# Patient Record
Sex: FEMALE | Race: BLACK OR AFRICAN AMERICAN | NOT HISPANIC OR LATINO
[De-identification: names, ages, dates, MRNs, and addresses within clinical notes are randomized per-mention and may not be internally consistent; named-entity substitution may affect disease eponyms.]

---

## 2019-11-21 ENCOUNTER — APPOINTMENT (OUTPATIENT)
Dept: ORTHOPEDIC SURGERY | Facility: CLINIC | Age: 64
End: 2019-11-21
Payer: COMMERCIAL

## 2019-11-21 VITALS — WEIGHT: 250 LBS | HEIGHT: 60 IN | BODY MASS INDEX: 49.08 KG/M2

## 2019-11-21 DIAGNOSIS — Z86.39 PERSONAL HISTORY OF OTHER ENDOCRINE, NUTRITIONAL AND METABOLIC DISEASE: ICD-10-CM

## 2019-11-21 DIAGNOSIS — Z78.9 OTHER SPECIFIED HEALTH STATUS: ICD-10-CM

## 2019-11-21 DIAGNOSIS — Z86.79 PERSONAL HISTORY OF OTHER DISEASES OF THE CIRCULATORY SYSTEM: ICD-10-CM

## 2019-11-21 PROBLEM — Z00.00 ENCOUNTER FOR PREVENTIVE HEALTH EXAMINATION: Status: ACTIVE | Noted: 2019-11-21

## 2019-11-21 PROCEDURE — 99202 OFFICE O/P NEW SF 15 MIN: CPT | Mod: 25

## 2019-11-21 PROCEDURE — 20526 THER INJECTION CARP TUNNEL: CPT | Mod: 50

## 2019-11-21 NOTE — PROCEDURE
[Bilateral] : bilaterally.   [Small Joint___] : [unfilled] joint [Inflammation] : Inflammation [Patient] : patient [Risk] : Risk [Bleeding] : bleeding [Verbal Consent Obtained] : verbal consent was obtained prior to the procedure [Alcohol] : Alcohol [Ethyl Chloride Spray] : ethyl chloride spray was used as a topical anesthetic [Superior] : superior [20] : a 20-gauge [0.25% Bupivacaine___(mL)] : [unfilled] mL 0.25% Bupivacaine [Dexameth. 4mg/mL___(mL)] : [unfilled] ~U mL of 4 mg/mL dexamethasone [Bandage Applied] : a bandage [Tolerated Well] : The patient tolerated the procedure well [None] : None

## 2019-11-25 PROBLEM — Z86.79 HISTORY OF HYPERTENSION: Status: RESOLVED | Noted: 2019-11-21 | Resolved: 2019-11-25

## 2019-11-25 PROBLEM — Z78.9 NEVER EXERCISES: Status: ACTIVE | Noted: 2019-11-21

## 2019-11-25 PROBLEM — Z86.39 HISTORY OF HIGH CHOLESTEROL: Status: RESOLVED | Noted: 2019-11-21 | Resolved: 2019-11-25

## 2019-11-25 PROBLEM — Z86.39 HISTORY OF DIABETES MELLITUS: Status: RESOLVED | Noted: 2019-11-21 | Resolved: 2019-11-25

## 2019-11-25 NOTE — PHYSICAL EXAM
[FreeTextEntry2] : Bilateral hands\par +Carpal tunnel compression\par +Phalens\par - Tinel's\par No weakness in abductor pollicis\par No thenar muscle wasting [de-identified] : Ultrasound performed outpatient engative for any tenosynovitis of bilateral hands\par XR read negative for fracture

## 2019-11-25 NOTE — HISTORY OF PRESENT ILLNESS
[de-identified] : 64F LHD with bilateral carpal tunnel syndrome for 10 years duration. Diagnosed at [facility] 10 years ago, confirmed with EMG study, has been wearing night splints for past 10 years however has noted worsening over bilateral hand pain L>R over past several months. Numbness/tingling in first 3.5 digits, no weakness. Is woken up at night with bilateral hand pain. Denies triggering symptoms.

## 2019-12-26 ENCOUNTER — APPOINTMENT (OUTPATIENT)
Dept: ORTHOPEDIC SURGERY | Facility: CLINIC | Age: 64
End: 2019-12-26

## 2019-12-26 NOTE — PHYSICAL EXAM
[de-identified] : General: Well-appearing middle-aged female; NAD; A&Ox3\par b/l hands: Grossly normal in appearance, no signs of trauma\par Normal muscle bulk of thenar eminence b/l\par Sensation intact over volar and dorsal aspects of all digits\par Muscle strength M/U/R nerves 5/5 and equal b/l\par Positive CT compression test on L, negative on R\par Negative Tinel's b/l\par Hand WWP, Cap refill < 2 sec [de-identified] : No imaging

## 2019-12-26 NOTE — HISTORY OF PRESENT ILLNESS
[de-identified] : 64F w longstanding b/l carpal tunnel syndrome s/p b/l steroid injections at last visit. Pt was sent for EMG but next available appt was not until 02/2020. Pt has improved significantly since injections last visit. Night time sxs, numbness/tingling sxs and weakness have resolved at this point.

## 2019-12-26 NOTE — ASSESSMENT
[FreeTextEntry1] : 64F w long standing carpal tunnel approx 5 weeks s/p b/l carpal tunnel injections w significant improvement in sxs\par -No repeat injection today\par -Advised patient to get next available appt for EMG study which is likely to be in March 2020\par -If sxs remain significantly improved, may call and cancel EMG study and no need to RTC\par -If sxs return, obtain EMG and may RTC in 4-6 weeks for repeat injection\par -Resume use of night splints PRN

## 2020-03-13 ENCOUNTER — APPOINTMENT (OUTPATIENT)
Dept: NEUROLOGY | Facility: CLINIC | Age: 65
End: 2020-03-13

## 2020-03-13 ENCOUNTER — APPOINTMENT (OUTPATIENT)
Dept: NEUROLOGY | Facility: CLINIC | Age: 65
End: 2020-03-13
Payer: COMMERCIAL

## 2020-03-13 VITALS
BODY MASS INDEX: 49.48 KG/M2 | SYSTOLIC BLOOD PRESSURE: 142 MMHG | DIASTOLIC BLOOD PRESSURE: 91 MMHG | OXYGEN SATURATION: 98 % | WEIGHT: 252 LBS | HEIGHT: 60 IN | TEMPERATURE: 98.2 F | HEART RATE: 69 BPM

## 2020-03-13 DIAGNOSIS — M25.539 PAIN IN UNSPECIFIED WRIST: ICD-10-CM

## 2020-03-13 PROCEDURE — 99203 OFFICE O/P NEW LOW 30 MIN: CPT

## 2020-03-13 PROCEDURE — 95911 NRV CNDJ TEST 9-10 STUDIES: CPT

## 2020-03-13 PROCEDURE — 95885 MUSC TST DONE W/NERV TST LIM: CPT | Mod: 59

## 2020-03-13 PROCEDURE — 76882 US LMTD JT/FCL EVL NVASC XTR: CPT | Mod: RT

## 2020-03-13 NOTE — HISTORY OF PRESENT ILLNESS
[FreeTextEntry1] : CC: pain in hands\par \par HPI: 64 year old woman referred by Dr. Fofana for pain in hands\par \par Location: wrists and hands, first three fingers; worse on the left than the right \par Quality: sharp pain\par Severity: moderate\par Duration: > 5 years \par Timing: worst at night\par Modifying Factors: worse with typing; initially better with steroid injections (11/2019) but symptoms started to return about a month ago and have been getting progressively worse\par Associated signs and symptoms: tingling, numbness\par \par Data reviewed:\par Imaging (reports): US of wrists no tenosynovitis of hands; x-ray - no fracture\par Prior records: Reviewed notes from Dr. Rai and Dr. Fofana\par \par ROS: 13 pt review of systems performed and reviewed with patient (General, Eyes, Ears, Cardiovascular, Respiratory, Gastrointestinal, Genitourinary, Musculoskeletal, Skin, Endocrine, Hematologic, Psychiatric, Neurologic)\par Past medical history, surgical history, social history, and family history reviewed with patient\par See scanned document for details

## 2020-03-13 NOTE — ASSESSMENT
[FreeTextEntry1] : NCS/EMG showed moderate bilateral median nerve entrapments, and the median nerves were focally enlarged at the wrists on ultrasound; these findings despite recent steroid injections. \par I recommended she f/u with Dr. Fofana for consideration of carpal tunnel release\par \par See separate procedure note for full results of study.

## 2020-03-13 NOTE — PROCEDURE
[FreeTextEntry1] : Nerve Conduction, Electromyography and Neuromuscular Ultrasound Report [FreeTextEntry3] : Electro Physiologic Findings:\par \par Limb temperature was monitored and maintained at approximately 32 – 36° C in the upper extremities.\par \par The median sensory and mixed nerve responses were slow across the wrists bilaterally, compared to the distal sensory short segments. The distal motor latencies were borderline-prolonged bilaterally (likely significant given her short stature), without conduction block. The SNAP and CMAP amplitudes were normal and symmetric. \par \par Needle electromyography was performed on the bilateral abductor pollicis brevis. There was mild chronic denervation on the right, and a mildly neurogenic firing pattern on the left without definite active or chronic denervation. \par \par Neuromuscular Ultrasound was performed on the bilateral median nerves, using an [employer] Gamma with a linear high-frequency (12-19Hz) transducer probe. \par \par Clinical Electrophysiological Impression: \par \par This electrodiagnostic study demonstrated evidence of moderate median nerve entrapments at the wrists bilaterally, with sensorimotor slowing without significant axon loss. The median nerves were also moderately focally enlarged at the wrists on ultrasound bilaterally. These findings, along with the patient’s history and exam, are consistent with bilateral carpal tunnel syndrome.

## 2020-03-13 NOTE — PHYSICAL EXAM
[FreeTextEntry1] : Gen: appears well, well-nourished, no acute distress\par \par MS: awake, alert, oriented, speech fluent, comprehension intact, good fund of knowledge, recent and remote memory intact, attention intact\par \par CN: PERRL, EOMI, visual fields full, facial strength and sensation intact and symmetric, palate elevation symmetric, tongue midline, no tongue atrophy or fasciculations\par \par Motor: normal bulk and tone, 5/5 strength throughout, no abnormal movements\par \par Sensory: Tinel's sign + at L > R wrist\par \par Reflexes: 2+ symmetric throughout\par \par Gait: normal

## 2020-03-13 NOTE — CONSULT LETTER
[Dear  ___] : Dear  [unfilled], [Consult Letter:] : I had the pleasure of evaluating your patient, [unfilled]. [Please see my note below.] : Please see my note below. [Consult Closing:] : Thank you very much for allowing me to participate in the care of this patient.  If you have any questions, please do not hesitate to contact me. [Sincerely,] : Sincerely, [FreeTextEntry3] : Lico Rolon M.D.\par Neurology, Electromyography and Neuromuscular Medicine\par [facility]\par \par  of Neurology\par [facility] [employer] [facility]

## 2020-03-13 NOTE — CONSULT LETTER
[Dear  ___] : Dear  [unfilled], [Consult Letter:] : I had the pleasure of evaluating your patient, [unfilled]. [Please see my note below.] : Please see my note below. [Consult Closing:] : Thank you very much for allowing me to participate in the care of this patient.  If you have any questions, please do not hesitate to contact me. [Sincerely,] : Sincerely, [FreeTextEntry3] : Lico Rolon M.D.\par Neurology, Electromyography and Neuromuscular Medicine\par [facility]\par \par  of Neurology\par [facility] [employer] [facility]

## 2020-03-13 NOTE — PROCEDURE
[FreeTextEntry1] : Nerve Conduction, Electromyography and Neuromuscular Ultrasound Report [FreeTextEntry3] : Electro Physiologic Findings:\par \par Limb temperature was monitored and maintained at approximately 32 – 36° C in the upper extremities.\par \par The median sensory and mixed nerve responses were slow across the wrists bilaterally, compared to the distal sensory short segments. The distal motor latencies were borderline-prolonged bilaterally (likely significant given her short stature), without conduction block. The SNAP and CMAP amplitudes were normal and symmetric. \par \par Needle electromyography was performed on the bilateral abductor pollicis brevis. There was mild chronic denervation on the right, and a mildly neurogenic firing pattern on the left without definite active or chronic denervation. \par \par Neuromuscular Ultrasound was performed on the bilateral median nerves, using an [employer] Gamma with a linear high-frequency (12-19Hz) transducer probe. \par \par Clinical Electrophysiological Impression: \par \par This electrodiagnostic study demonstrated evidence of moderate median nerve entrapments at the wrists bilaterally, with sensorimotor slowing without significant axon loss. The median nerves were also moderately focally enlarged at the wrists on ultrasound bilaterally. These findings, along with the patient’s history and exam, are consistent with bilateral carpal tunnel syndrome.

## 2020-06-04 ENCOUNTER — APPOINTMENT (OUTPATIENT)
Dept: ORTHOPEDIC SURGERY | Facility: CLINIC | Age: 65
End: 2020-06-04
Payer: COMMERCIAL

## 2020-06-04 PROCEDURE — 99213 OFFICE O/P EST LOW 20 MIN: CPT

## 2020-06-04 NOTE — ASSESSMENT
[FreeTextEntry1] : 64 year old female with bilateral carpal tunnel syndrome left worse than right.  She is left hand dominant.  She would like to proceed with carpal tunnel release of the left hand.  She has failed conservative treatment including bracing and steroid injections. She has a nerve conduction study confirming moderate carpal tunnel.  We discussed the risks and benefits.  The risks include anesthesia, covid-19, damage to neurovascular structures, infection, chronic pain, loss of function, clots, stroke, myocardial infarct, and need for reoperation.  She is at elevated risk for surgery due to her medical comorbidities (diabetes).  She understands this and elects to proceed with left carpal tunnel release.  She will need preoperative risk stratification and a Covid-19 test 72 hours prior to surgery.  Due to Covid-19 and the delay of elective surgeries we will call when it is safe to schedule her for surgery.  She knows to call with any questions or concerns or if her symptoms acutely worsen.

## 2020-06-04 NOTE — HISTORY OF PRESENT ILLNESS
[de-identified] : 64 year old female with bilateral carpal tunnel syndrome referred by neurologist Dr. Lico Rolon.  She is left hand dominant and the pain is much worse in the left hand.  She says it is keeping her up at night.  She has tried bracing and corticosteroid injections without sustained relief.  She had a nerve conduction study performed by Dr. Lico Rolon showing bilateral carpal tunnel syndrome.  She was referred for surgical discussion and carpal tunnel release. She is diabetic and says her sugars are usually in the low 100s.  She does not recall her last hemoglobin A1C.

## 2020-06-04 NOTE — PHYSICAL EXAM
[de-identified] : General: NAD AAOX3\par b/l hands: Grossly normal in appearance, no signs of trauma\par Normal muscle bulk of thenar eminence b/l\par Decreased sensation in median nerve distribution on left\par +Tinel's on left, +durkins compression bilaterally +Phelans bilaterally\par SILT u/r\par +motor EPL/FPL/EDC/FDP/IO\par WWP distally, palpable radial pulse

## 2020-07-23 ENCOUNTER — TRANSCRIPTION ENCOUNTER (OUTPATIENT)
Age: 65
End: 2020-07-23

## 2020-07-23 NOTE — ASU PATIENT PROFILE, ADULT - PMH
CAD (coronary artery disease)    DM (diabetes mellitus)    Dyslipidemia    HTN (hypertension)    Lymphedema  b/l  lower extremities

## 2020-07-24 ENCOUNTER — OUTPATIENT (OUTPATIENT)
Dept: OUTPATIENT SERVICES | Facility: HOSPITAL | Age: 65
LOS: 1 days | Discharge: HOME | End: 2020-07-24
Payer: COMMERCIAL

## 2020-07-24 VITALS
DIASTOLIC BLOOD PRESSURE: 79 MMHG | HEART RATE: 80 BPM | WEIGHT: 245.59 LBS | TEMPERATURE: 97 F | HEIGHT: 60 IN | SYSTOLIC BLOOD PRESSURE: 122 MMHG | RESPIRATION RATE: 16 BRPM

## 2020-07-24 VITALS — TEMPERATURE: 98 F | HEART RATE: 1 BPM

## 2020-07-24 DIAGNOSIS — Z98.891 HISTORY OF UTERINE SCAR FROM PREVIOUS SURGERY: Chronic | ICD-10-CM

## 2020-07-24 LAB
GLUCOSE BLDC GLUCOMTR-MCNC: 108 MG/DL — HIGH (ref 70–99)
GLUCOSE BLDC GLUCOMTR-MCNC: 126 MG/DL — HIGH (ref 70–99)

## 2020-07-24 PROCEDURE — 64721 CARPAL TUNNEL SURGERY: CPT | Mod: LT

## 2020-07-24 PROCEDURE — 82962 GLUCOSE BLOOD TEST: CPT

## 2020-07-24 RX ORDER — HYDROMORPHONE HYDROCHLORIDE 2 MG/ML
0.5 INJECTION INTRAMUSCULAR; INTRAVENOUS; SUBCUTANEOUS
Refills: 0 | Status: DISCONTINUED | OUTPATIENT
Start: 2020-07-24 | End: 2020-07-24

## 2020-07-24 RX ORDER — METOCLOPRAMIDE HCL 10 MG
10 TABLET ORAL ONCE
Refills: 0 | Status: DISCONTINUED | OUTPATIENT
Start: 2020-07-24 | End: 2020-07-24

## 2020-07-24 RX ORDER — OXYCODONE AND ACETAMINOPHEN 5; 325 MG/1; MG/1
2 TABLET ORAL EVERY 6 HOURS
Refills: 0 | Status: DISCONTINUED | OUTPATIENT
Start: 2020-07-24 | End: 2020-07-24

## 2020-07-24 RX ORDER — SODIUM CHLORIDE 9 MG/ML
1000 INJECTION, SOLUTION INTRAVENOUS
Refills: 0 | Status: DISCONTINUED | OUTPATIENT
Start: 2020-07-24 | End: 2020-07-24

## 2020-07-24 RX ORDER — ONDANSETRON 8 MG/1
4 TABLET, FILM COATED ORAL EVERY 6 HOURS
Refills: 0 | Status: DISCONTINUED | OUTPATIENT
Start: 2020-07-24 | End: 2020-07-24

## 2020-07-24 RX ORDER — OXYCODONE AND ACETAMINOPHEN 5; 325 MG/1; MG/1
1 TABLET ORAL EVERY 4 HOURS
Refills: 0 | Status: DISCONTINUED | OUTPATIENT
Start: 2020-07-24 | End: 2020-07-24

## 2020-07-24 RX ORDER — OXYCODONE AND ACETAMINOPHEN 5; 325 MG/1; MG/1
5-325 TABLET ORAL
Qty: 12 | Refills: 0 | Status: ACTIVE | COMMUNITY
Start: 2020-07-24 | End: 1900-01-01

## 2020-07-24 NOTE — ASU PREOP CHECKLIST - WEIGHT IN KG
Pre-application: Motor, sensory, and vascular responses NOT intact in the injured extremity./The patient/caregiver verbalized understanding of how to care for the injured extremity with splint./Post-application: Motor, sensory, and vascular responses NOT intact in the injured extremity.
111.4

## 2020-07-24 NOTE — PACU DISCHARGE NOTE - COMMENTS
PT pain free sensation absent sling applied on by the ortho PA. D/C home daughter the escort. CAP REFILL<3 SECONDS

## 2020-08-06 ENCOUNTER — APPOINTMENT (OUTPATIENT)
Dept: ORTHOPEDIC SURGERY | Facility: CLINIC | Age: 65
End: 2020-08-06
Payer: COMMERCIAL

## 2020-08-06 PROBLEM — E78.5 HYPERLIPIDEMIA, UNSPECIFIED: Chronic | Status: ACTIVE | Noted: 2020-07-23

## 2020-08-06 PROBLEM — I25.10 ATHEROSCLEROTIC HEART DISEASE OF NATIVE CORONARY ARTERY WITHOUT ANGINA PECTORIS: Chronic | Status: ACTIVE | Noted: 2020-07-23

## 2020-08-06 PROBLEM — I89.0 LYMPHEDEMA, NOT ELSEWHERE CLASSIFIED: Chronic | Status: ACTIVE | Noted: 2020-07-23

## 2020-08-06 PROBLEM — I10 ESSENTIAL (PRIMARY) HYPERTENSION: Chronic | Status: ACTIVE | Noted: 2020-07-23

## 2020-08-06 PROBLEM — E11.9 TYPE 2 DIABETES MELLITUS WITHOUT COMPLICATIONS: Chronic | Status: ACTIVE | Noted: 2020-07-23

## 2020-08-06 PROCEDURE — 99024 POSTOP FOLLOW-UP VISIT: CPT

## 2020-08-10 NOTE — HISTORY OF PRESENT ILLNESS
[de-identified] : 1st post op visit [de-identified] : She is now 2 weeks postop and states feels well. Denies fevers. Reports improvement in sensation and hand pain especially at night. [de-identified] : Examination demonstrates a healing incision without any signs of infection. She is tolerating wrist motion and digital ROM.  [de-identified] : She is doing well 2 weeks s/p CTR (left) [de-identified] : Her sutures were removed and she has steri-strips now. No submerging in water until 24-48 hrs from now. However, can shower normally without scrubbing incision. \par We will see her again in 4 weeks. Digital and wrist ROM was encouraged.

## 2020-09-03 ENCOUNTER — APPOINTMENT (OUTPATIENT)
Dept: ORTHOPEDIC SURGERY | Facility: CLINIC | Age: 65
End: 2020-09-03
Payer: COMMERCIAL

## 2020-09-03 DIAGNOSIS — G56.03 CARPAL TUNNEL SYNDROM,BILATERAL UPPER LIMBS: ICD-10-CM

## 2020-09-03 PROCEDURE — 99024 POSTOP FOLLOW-UP VISIT: CPT

## 2020-09-07 NOTE — HISTORY OF PRESENT ILLNESS
[de-identified] : left wrist  [de-identified] : SILT in median nerve distribution. Incision CDI, well healed. [de-identified] : 65F 6 weeks s/p L CTR [de-identified] : Patient is 6 weeks s/p L CTR. Endorses occasional numbness in first three digits but overall much improved from pre-op. Patient does not wake up anymore at night, she is very satisfied with the results of the procedure.  [de-identified] : WBAT L hand. Patient will return to clinic at the end of September to schedule CTR for the contralateral side.

## 2020-11-05 ENCOUNTER — APPOINTMENT (OUTPATIENT)
Dept: ORTHOPEDIC SURGERY | Facility: CLINIC | Age: 65
End: 2020-11-05

## 2020-12-03 ENCOUNTER — APPOINTMENT (OUTPATIENT)
Dept: ORTHOPEDIC SURGERY | Facility: CLINIC | Age: 65
End: 2020-12-03

## 2023-10-01 PROBLEM — G56.03 CARPAL TUNNEL SYNDROME, BILATERAL UPPER LIMBS: Status: ACTIVE | Noted: 2019-11-21
